# Patient Record
Sex: FEMALE | Race: WHITE | Employment: OTHER | RURAL
[De-identification: names, ages, dates, MRNs, and addresses within clinical notes are randomized per-mention and may not be internally consistent; named-entity substitution may affect disease eponyms.]

---

## 2021-10-03 ENCOUNTER — APPOINTMENT (OUTPATIENT)
Dept: GENERAL RADIOLOGY | Age: 81
End: 2021-10-03
Attending: EMERGENCY MEDICINE
Payer: MEDICARE

## 2021-10-03 ENCOUNTER — HOSPITAL ENCOUNTER (EMERGENCY)
Age: 81
Discharge: HOME | End: 2021-10-03
Attending: EMERGENCY MEDICINE
Payer: MEDICARE

## 2021-10-03 VITALS
DIASTOLIC BLOOD PRESSURE: 55 MMHG | SYSTOLIC BLOOD PRESSURE: 134 MMHG | HEIGHT: 60 IN | WEIGHT: 105 LBS | BODY MASS INDEX: 20.62 KG/M2 | TEMPERATURE: 96.5 F | HEART RATE: 74 BPM | OXYGEN SATURATION: 97 %

## 2021-10-03 DIAGNOSIS — M25.461 KNEE EFFUSION, RIGHT: Primary | ICD-10-CM

## 2021-10-03 DIAGNOSIS — S51.019A SKIN TEAR OF ELBOW WITHOUT COMPLICATION, INITIAL ENCOUNTER: ICD-10-CM
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
## 2021-10-03 DIAGNOSIS — S81.811A SKIN TEAR OF RIGHT LOWER LEG WITHOUT COMPLICATION, INITIAL ENCOUNTER: ICD-10-CM

## 2021-10-03 PROCEDURE — 73562 X-RAY EXAM OF KNEE 3: CPT

## 2021-10-03 PROCEDURE — 75810000054 HC ARTHOCENTISIS JOINT

## 2021-10-03 PROCEDURE — 74011250637 HC RX REV CODE- 250/637: Performed by: EMERGENCY MEDICINE

## 2021-10-03 PROCEDURE — 99283 EMERGENCY DEPT VISIT LOW MDM: CPT

## 2021-10-03 RX ORDER — DOXYCYCLINE HYCLATE 100 MG
100 TABLET ORAL 2 TIMES DAILY
Qty: 14 TABLET | Refills: 0 | Status: SHIPPED | OUTPATIENT
Start: 2021-10-03 | End: 2021-10-10

## 2021-10-03 RX ORDER — HYDROCODONE BITARTRATE AND ACETAMINOPHEN 5; 325 MG/1; MG/1
1 TABLET ORAL
Status: COMPLETED | OUTPATIENT
Start: 2021-10-03 | End: 2021-10-03

## 2021-10-03 RX ORDER — HYDROCODONE BITARTRATE AND ACETAMINOPHEN 5; 325 MG/1; MG/1
1 TABLET ORAL
Qty: 10 TABLET | Refills: 0 | Status: SHIPPED | OUTPATIENT
Start: 2021-10-03 | End: 2021-10-06

## 2021-10-03 RX ADMIN — HYDROCODONE BITARTRATE AND ACETAMINOPHEN 1 TABLET: 5; 325 TABLET ORAL at 10:36

## 2021-10-03 NOTE — ED PROVIDER NOTES
EMERGENCY DEPARTMENT HISTORY AND PHYSICAL EXAM          Date: 10/3/2021  Patient Name: Earnest Diaz    History of Presenting Illness     Chief Complaint   Patient presents with    Fall       History Provided By: Patient    HPI: Earnest Diaz is a 80 y.o. female, pmhx COPD, HTN, who presents ambulatory to the ED c/o rt leg pain    Patient visiting here and walking down two porch steps when she slipped and landed on her right lowe leg and scraped her left arm. She denies hitting her head, neck pain, chest pain, rib pain, worsening SOB. COmplaining of Rt knee and RLE pain preventing sleep. Pain currently 8/10 after OTC medication this AM  Patient specifically denies any recent fevers, chills, nausea, vomiting, diarrhea, abd pain, CP, SOB, urinary sxs, changes in BM, or headache. PCP: Jose Guadalupe Valdovinos MD    Allergies: PCN   Social Hx: +tobacco, -vaping, +EtOH, -Illicit Drugs; Visiting from [place]    There are no other complaints, changes, or physical findings at this time. Past History     Past Medical History:  History reviewed. No pertinent past medical history. Past Surgical History:  No past surgical history on file. Family History:  History reviewed. No pertinent family history. Social History:  Social History     Tobacco Use    Smoking status: Not on file   Substance Use Topics    Alcohol use: Not on file    Drug use: Not on file       Allergies: Allergies   Allergen Reactions    Pcn [Penicillins] Swelling         Review of Systems   Review of Systems   Constitutional: Negative for activity change, appetite change, chills, fever and unexpected weight change. HENT: Negative for congestion. Eyes: Negative for pain and visual disturbance. Respiratory: Negative for cough and shortness of breath. Cardiovascular: Negative for chest pain. Gastrointestinal: Negative for abdominal pain, diarrhea, nausea and vomiting. Genitourinary: Negative for dysuria. Musculoskeletal: Positive for arthralgias, gait problem and joint swelling. Negative for back pain. Skin: Positive for wound. Negative for rash. Neurological: Negative for headaches. Physical Exam   Physical Exam  Vitals and nursing note reviewed. Constitutional:       Appearance: She is well-developed. She is not diaphoretic. Comments: Thin elderly female in distress due to pain   HENT:      Head: Normocephalic and atraumatic. Eyes:      General:         Right eye: No discharge. Left eye: No discharge. Conjunctiva/sclera: Conjunctivae normal.      Pupils: Pupils are equal, round, and reactive to light. Cardiovascular:      Rate and Rhythm: Normal rate and regular rhythm. Heart sounds: Normal heart sounds. No murmur heard. Pulmonary:      Effort: Pulmonary effort is normal. No respiratory distress. Breath sounds: Normal breath sounds. No wheezing or rales. Abdominal:      General: Bowel sounds are normal. There is no distension. Palpations: Abdomen is soft. Tenderness: There is no abdominal tenderness. Musculoskeletal:         General: Swelling (Rt medial knee with tenderness along the joint line), tenderness and signs of injury present. No deformity. Normal range of motion. Cervical back: Normal range of motion and neck supple. Right lower leg: No edema. Left lower leg: No edema. Skin:     General: Skin is warm and dry. Capillary Refill: Capillary refill takes less than 2 seconds. Findings: No rash. Comments: RLE skin tear c shaped approximately 7cm with superficial skin overlay and approximately 1\" gap. Left elbow skin tear x2; 1cm each, superficial with avulsive skin tear and no available overlaying skin   Neurological:      Mental Status: She is alert and oriented to person, place, and time. Cranial Nerves: No cranial nerve deficit. Motor: No abnormal muscle tone.        Diagnostic Study Results     Labs -   No results found for this or any previous visit (from the past 12 hour(s)). Radiologic Studies -   XR KNEE RT 3 V   Final Result   Mild knee joint effusion. .        CT Results  (Last 48 hours)    None        CXR Results  (Last 48 hours)    None            Medical Decision Making   I am the first provider for this patient. I reviewed the vital signs, available nursing notes, past medical history, past surgical history, family history and social history. Vital Signs-Reviewed the patient's vital signs. Patient Vitals for the past 12 hrs:   Temp Pulse BP SpO2   10/03/21 1118 (!) 96.5 °F (35.8 °C)      10/03/21 0954  74 (!) 134/55 97 %       Pulse Oximetry Analysis - 97% on RA    Records Reviewed: Nursing Notes    Provider Notes (Medical Decision Making):   MDM: Elderly female presents 1 day post fall with knee pain and edema. Skin laceration appears dressed with good dressing but there is a large gap which could be fixed. Will place her into a bed for irrigation and cleansing of her wound, repair of the skin tear, and x-ray evaluation for fracture and traumatic hematoma    ED Course:   Initial assessment performed. The patients presenting problems have been discussed, and they are in agreement with the care plan formulated and outlined with them. I have encouraged them to ask questions as they arise throughout their visit. PROGRESS NOTE:  11:45 AM  Pt remains in the hallway. Requested a bed from staff so I can fix her wound. X-ray result reviewed with knee effusion but without any evidence of bloody effusion. Given the amount of edema with pain, discussed with patient and her daughter the option for arthrocentesis to which patient agreed. 12 PM  Skin tear wound edges of the right lower extremity been approximated after washing with sterile saline. After the wound edges were realigned Steri-Strips were placed and the patient tolerated moderately well although it did cause her pain.   Total time in wound repair approximately 12 minutes. 12:15 PM  Using Betadine prep, 3 mils of lidocaine placed anteriorly and medially in the right knee. Using 18-gauge needle attempted aspiration of the effusion without success. Recommend patient follow-up with orthopedics when they get home from vacation if the knee remains swollen and painful. In the meantime recommend Ace wrap and ice for effusion management. Daughter is at bedside and would like medications for pain control and possibly antibiotics given the size of the skin tear to which I think is reasonable. Discharge note:  Pt re-evaluated and noted to be feeling better, ready for discharge. Updated pt on all final imaging findings. Will follow up as instructed. All questions have been answered, pt voiced understanding and agreement with plan. Narcotics were prescribed, pt was advised not to drive or operate heavy machinery. Abx were prescribed, pt advised that diarrhea and rash are possible side effects of the medications. Specific return precautions provided as well as instructions to return to the ED should sx worsen at any time. Vital signs stable for discharge. Critical Care Time:   0      Diagnosis     Clinical Impression:   1. Knee effusion, right    2. Fall, initial encounter    3. Skin tear of elbow without complication, initial encounter    4. Skin tear of right lower leg without complication, initial encounter        PLAN:  1. Discharge Medication List as of 10/3/2021 12:47 PM      START taking these medications    Details   HYDROcodone-acetaminophen (Norco) 5-325 mg per tablet Take 1 Tablet by mouth every six (6) hours as needed for Pain for up to 3 days. Max Daily Amount: 4 Tablets., Normal, Disp-10 Tablet, R-0      doxycycline (VIBRA-TABS) 100 mg tablet Take 1 Tablet by mouth two (2) times a day for 7 days. , Normal, Disp-14 Tablet, R-0           2.    Follow-up Information     Follow up With Specialties Details Why Contact Info Maira Moore MD Family Medicine Schedule an appointment as soon as possible for a visit  for orthopedics referral as needed [phone] [id number]  [phone]      [address] [place] [address] Street [address] [place] [address] Emergency Medicine  If symptoms worsen [address] [place] [address]  [phone]        Return to ED if worse     Disposition:  Home       Please note, this dictation was completed with [employer], the computer voice recognition software. Quite often unanticipated grammatical, syntax, homophones, and other interpretive errors are inadvertently transcribed by the computer software. Please disregard these errors. Please excuse any errors that have escaped final proof reading.

## 2021-10-03 NOTE — ED TRIAGE NOTES
Patient states she fell down one step yesterday. Complains of right knee pain and two skin tears. + skin tear on right leg and left elbow. Denies LOC.